# Patient Record
Sex: FEMALE | Race: WHITE | NOT HISPANIC OR LATINO | Employment: FULL TIME | URBAN - METROPOLITAN AREA
[De-identification: names, ages, dates, MRNs, and addresses within clinical notes are randomized per-mention and may not be internally consistent; named-entity substitution may affect disease eponyms.]

---

## 2019-07-18 ENCOUNTER — OFFICE VISIT (OUTPATIENT)
Dept: URGENT CARE | Facility: RETAIL CLINIC | Age: 54
End: 2019-07-18
Payer: COMMERCIAL

## 2019-07-18 VITALS
DIASTOLIC BLOOD PRESSURE: 72 MMHG | HEART RATE: 64 BPM | OXYGEN SATURATION: 98 % | SYSTOLIC BLOOD PRESSURE: 107 MMHG | TEMPERATURE: 98 F

## 2019-07-18 DIAGNOSIS — H60.391 INFECTIVE OTITIS EXTERNA, RIGHT: Primary | ICD-10-CM

## 2019-07-18 PROCEDURE — 99213 OFFICE O/P EST LOW 20 MIN: CPT | Performed by: NURSE PRACTITIONER

## 2019-07-18 RX ORDER — NEOMYCIN POLYMYXIN B SULFATES AND DEXAMETHASONE 3.5; 10000; 1 MG/ML; [USP'U]/ML; MG/ML
SUSPENSION/ DROPS OPHTHALMIC
Qty: 5 ML | Refills: 0 | Status: SHIPPED | OUTPATIENT
Start: 2019-07-18 | End: 2019-07-23

## 2019-07-18 ASSESSMENT — ENCOUNTER SYMPTOMS
APPETITE CHANGE: 0
SORE THROAT: 0
HEADACHES: 0
DIZZINESS: 0
SINUS PRESSURE: 0
COLOR CHANGE: 0
ADENOPATHY: 0
FEVER: 0
COUGH: 0
FATIGUE: 0
ACTIVITY CHANGE: 0
WEAKNESS: 0
LIGHT-HEADEDNESS: 0
DIAPHORESIS: 0
CHILLS: 0

## 2019-07-18 NOTE — PATIENT INSTRUCTIONS
Discussed both otitis externa and cerumen build up. Favor treating infection first prior to in office irrigation as this could damage canal.  Neomycin-polymyxin-dexamethasone (Maxitrol) 3.5-911422-1.1 opthalmic suspension   3 drops into ear 4 times daily for 7 days  Tylenol or ibuprofen as needed for pain relief.  No Qtips in ear canal. You may clean drainage from external ear with Qtip.  Keep water out of ear until the infection is cleared.  May swim with ear plug if not painful.  Avoid submerging head in bath for 1 week.    Followup with PCP if symptoms worsen or do not resolve.  Can be seen in the future at [facility] for ear lavage without infection.

## 2019-07-18 NOTE — PROGRESS NOTES
Chief Complaint   Patient presents with     Ear Problem     right ear plugged x 5 days     SUBJECTIVE:  Ysabel Griffith is a 53 year old female who presents for evaluation of right ear pain, fullness and wax for 5 day(s).  Severity: moderate   Timing: gradual onset  Additional symptoms include none.  Treatment measures tried include: debrox, candle.  History of recurrent otitis: no, but she has needed her ears lavaged years ago.  Predisposing factors include: None.    Past Medical History:   Diagnosis Date     Recurrent major depression in complete remission (H) 12/18/2012       No current outpatient medications on file prior to visit.  No current facility-administered medications on file prior to visit.   Social History     Tobacco Use     Smoking status: Never Smoker   Substance Use Topics     Alcohol use: No     Allergies   Allergen Reactions     No Known Allergies      Review of Systems   Constitutional: Negative for activity change, appetite change, chills, diaphoresis, fatigue and fever.   HENT: Positive for ear pain (and fullness, right.). Negative for congestion, ear discharge, hearing loss, sinus pressure, sore throat and tinnitus.    Respiratory: Negative for cough.    Skin: Negative for color change and rash.   Neurological: Negative for dizziness, weakness, light-headedness and headaches.   Hematological: Negative for adenopathy.     OBJECTIVE:  /72   Pulse 64   Temp 98  F (36.7  C) (Oral)   SpO2 98%      Physical Exam   Constitutional: She is oriented to person, place, and time. She appears well-developed and well-nourished. No distress.   HENT:   Head: Normocephalic and atraumatic.   Nose: Nose normal.   Mouth/Throat: Oropharynx is clear and moist.   Left external auditory canal with mild cerumen, tympanic membrane pearly white. Right canal with erythema, edema, maceration, thick off white discharge, blood, and moderate orange cerumen. Right canal was tender with otoscope inspection.  Could not visualize right tympanic membrane.   Eyes: Pupils are equal, round, and reactive to light. Conjunctivae and EOM are normal. Right eye exhibits no discharge. Left eye exhibits no discharge.   Neck: Normal range of motion. Neck supple.   Pulmonary/Chest: Effort normal. No respiratory distress.   Musculoskeletal: Normal range of motion. She exhibits no tenderness.   Lymphadenopathy:     She has no cervical adenopathy.   Neurological: She is alert and oriented to person, place, and time.   Skin: Skin is warm and dry. No rash noted. She is not diaphoretic.   Psychiatric: She has a normal mood and affect. Her behavior is normal.   Vitals reviewed.    ASSESSMENT:    ICD-10-CM    1. Infective otitis externa, right H60.391 neomycin-polymixin-dexamethasone (MAXITROL) 0.1 % ophthalmic suspension       PLAN:   Patient Instructions   Discussed both otitis externa and cerumen build up. Favor treating infection first prior to in office irrigation as this could damage canal.  Neomycin-polymyxin-dexamethasone (Maxitrol) 3.5-599546-9.1 opthalmic suspension   3 drops into ear 4 times daily for 7 days  Tylenol or ibuprofen as needed for pain relief.  No Qtips in ear canal. You may clean drainage from external ear with Qtip.  Keep water out of ear until the infection is cleared.  May swim with ear plug if not painful.  Avoid submerging head in bath for 1 week.    Followup with PCP if symptoms worsen or do not resolve.  Can be seen in the future at [facility] for ear lavage without infection.      Follow up with primary care provider with any problems, questions or concerns or if symptoms worsen or fail to improve. Patient agreed to plan and verbalized understanding.    LAURA Penaloza  [facility]

## 2019-07-23 ENCOUNTER — OFFICE VISIT (OUTPATIENT)
Dept: FAMILY MEDICINE | Facility: CLINIC | Age: 54
End: 2019-07-23
Payer: COMMERCIAL

## 2019-07-23 VITALS
BODY MASS INDEX: 25.74 KG/M2 | DIASTOLIC BLOOD PRESSURE: 72 MMHG | HEIGHT: 64 IN | OXYGEN SATURATION: 100 % | RESPIRATION RATE: 16 BRPM | TEMPERATURE: 97.9 F | HEART RATE: 104 BPM | SYSTOLIC BLOOD PRESSURE: 100 MMHG | WEIGHT: 150.8 LBS

## 2019-07-23 DIAGNOSIS — H92.01 OTALGIA, RIGHT: Primary | ICD-10-CM

## 2019-07-23 DIAGNOSIS — H61.21 IMPACTED CERUMEN OF RIGHT EAR: ICD-10-CM

## 2019-07-23 DIAGNOSIS — F33.42 RECURRENT MAJOR DEPRESSION IN COMPLETE REMISSION (H): ICD-10-CM

## 2019-07-23 DIAGNOSIS — Z12.31 ENCOUNTER FOR SCREENING MAMMOGRAM FOR BREAST CANCER: ICD-10-CM

## 2019-07-23 DIAGNOSIS — Z23 ENCOUNTER FOR IMMUNIZATION: ICD-10-CM

## 2019-07-23 PROCEDURE — 90471 IMMUNIZATION ADMIN: CPT | Performed by: FAMILY MEDICINE

## 2019-07-23 PROCEDURE — 99213 OFFICE O/P EST LOW 20 MIN: CPT | Mod: 25 | Performed by: FAMILY MEDICINE

## 2019-07-23 PROCEDURE — 69210 REMOVE IMPACTED EAR WAX UNI: CPT | Mod: RT | Performed by: FAMILY MEDICINE

## 2019-07-23 PROCEDURE — 90715 TDAP VACCINE 7 YRS/> IM: CPT | Performed by: FAMILY MEDICINE

## 2019-07-23 ASSESSMENT — PAIN SCALES - GENERAL: PAINLEVEL: SEVERE PAIN (7)

## 2019-07-23 ASSESSMENT — MIFFLIN-ST. JEOR: SCORE: 1274.02

## 2019-07-23 ASSESSMENT — PATIENT HEALTH QUESTIONNAIRE - PHQ9: SUM OF ALL RESPONSES TO PHQ QUESTIONS 1-9: 0

## 2019-07-23 NOTE — PROGRESS NOTES
"Subjective     Ysabel Griffith is a 53 year old female who presents to clinic today for the following health issues:    HPI   RESPIRATORY SYMPTOMS      Duration: 2 weeks    Description  ear pain bilateral, but more in the right then the left    Severity: severe    Accompanying signs and symptoms: None    History (predisposing factors):  none    Precipitating or alleviating factors: None    Therapies tried and outcome:  Prescribed ear drops that gave no relief.          Ear pain for past 2 weeks.  Decreased hearing.  [facility]  Not better.  No left ear symptoms.    Reviewed and updated as needed this visit by Provider  Tobacco  Allergies  Meds  Problems  Med Hx  Surg Hx  Fam Hx         Review of Systems   ROS COMP: Constitutional, HEENT, cardiovascular, pulmonary, gi and gu systems are negative, except as otherwise noted.      Objective    /72   Pulse 104   Temp 97.9  F (36.6  C) (Temporal)   Resp 16   Ht 1.626 m (5' 4\")   Wt 68.4 kg (150 lb 12.8 oz)   LMP 12/04/2012   SpO2 100%   BMI 25.88 kg/m    Body mass index is 25.88 kg/m .  Physical Exam   Constitutional: She appears well-developed and well-nourished.   HENT:   Right ear canal obstructed with cerumen.  This was first flushed by nursing using tap water.  It was almost completely removed, but there was some cerumen remnants that were removed by me with cerumen spoon.  Canal reexamined using otoscope and TM was within normal limits.  Her hearing and ear pressure/pain improved after cerumen was removed.    Left ear canal had mild cerumen circumferentially around the distal end.  Attempted to remove with ear curette but had to stop due to patient discomfort.                  Assessment & Plan     ASSESSMENT/ORDERS:    ICD-10-CM    1. Otalgia, right H92.01 REMOVE IMPACTED CERUMEN   2. Impacted cerumen of right ear H61.21 REMOVE IMPACTED CERUMEN   3. Encounter for screening mammogram for breast cancer " "Z12.31 *MA Screening Digital Bilateral   4. Encounter for immunization Z23 TDAP, IM (10 - 64 YRS) - Adacel     ADMIN 1st VACCINE   5. Recurrent major depression in complete remission (H) F33.42 DEPRESSION ACTION PLAN (DAP)     PLAN:  1.  Cerumen removed as noted above.  Symptoms improved.      BMI:   Estimated body mass index is 25.88 kg/m  as calculated from the following:    Height as of this encounter: 1.626 m (5' 4\").    Weight as of this encounter: 68.4 kg (150 lb 12.8 oz).               Return in about 2 months (around 9/23/2019) for next preventative visit (Physical).    Vega Jonas MD  [facility]    "

## 2019-07-23 NOTE — NURSING NOTE
Screening Mammogram ordered and scheduled.  Appointment for Physical scheduled.  Ysabel is notified of dates and times and AVS given to pt.

## 2019-12-07 ENCOUNTER — OFFICE VISIT (OUTPATIENT)
Dept: URGENT CARE | Facility: RETAIL CLINIC | Age: 54
End: 2019-12-07

## 2019-12-07 VITALS
HEART RATE: 95 BPM | RESPIRATION RATE: 16 BRPM | TEMPERATURE: 99 F | DIASTOLIC BLOOD PRESSURE: 71 MMHG | OXYGEN SATURATION: 98 % | SYSTOLIC BLOOD PRESSURE: 101 MMHG

## 2019-12-07 DIAGNOSIS — J20.9 ACUTE BRONCHITIS WITH SYMPTOMS > 10 DAYS: Primary | ICD-10-CM

## 2019-12-07 PROCEDURE — 99213 OFFICE O/P EST LOW 20 MIN: CPT | Performed by: FAMILY MEDICINE

## 2019-12-07 RX ORDER — DOXYCYCLINE 100 MG/1
100 CAPSULE ORAL 2 TIMES DAILY
Qty: 20 CAPSULE | Refills: 0 | Status: SHIPPED | OUTPATIENT
Start: 2019-12-07 | End: 2019-12-17

## 2019-12-07 NOTE — PROGRESS NOTES
SUBJECTIVE:  Ysabel Griffith is a 54 year old female who presents to the clinic today with a chief complaint of cough  for 3 week(s).  Her cough is described as persistent and productive of yellow sputum.    The patient's symptoms are moderate and worsening.  Associated symptoms include nasal congestion, rhinorrhea, malaise and myalgias. The patient's symptoms are exacerbated by lying down  Patient has been using OTC cough suppressants  to improve symptoms.    Past Medical History:   Diagnosis Date     Recurrent major depression in complete remission (H) 12/18/2012     Current Outpatient Medications   Medication Sig Dispense Refill     doxycycline monohydrate (MONODOX) 100 MG capsule Take 1 capsule (100 mg) by mouth 2 times daily for 10 days 20 capsule 0     History   Smoking Status     Never Smoker   Smokeless Tobacco     Never Used       ROS  Review of systems negative except as stated above.    OBJECTIVE:  /71 (BP Location: Left arm, Patient Position: Sitting, Cuff Size: Adult Regular)   Pulse 95   Temp 99  F (37.2  C) (Oral)   Resp 16   LMP 12/04/2012   SpO2 98%   GENERAL APPEARANCE: alert, mild distress and cooperative  EYES: EOMI,  PERRL, conjunctiva clear  HENT: ear canals and TM's normal.  Nose and mouth without ulcers, erythema or lesions  NECK: supple, nontender, no lymphadenopathy  RESP: rhonchi few scattered  CV: regular rates and rhythm, normal S1 S2, no murmur noted  ABDOMEN:  soft, nontender, no HSM or masses and bowel sounds normal  NEURO: Normal strength and tone, sensory exam grossly normal,  normal speech and mentation  SKIN: no suspicious lesions or rashes    ASSESSMENT:    Acute Bronchitis  Acute Sinusitis    PLAN:  Doxycycline, rest.  Symptomatic measures encouraged, humidified air, plenty of fluids.  Follow up with primary care provider if no improvement.

## 2020-11-06 ENCOUNTER — OFFICE VISIT (OUTPATIENT)
Dept: FAMILY MEDICINE | Facility: CLINIC | Age: 55
End: 2020-11-06
Payer: COMMERCIAL

## 2020-11-06 VITALS
SYSTOLIC BLOOD PRESSURE: 102 MMHG | TEMPERATURE: 97.9 F | BODY MASS INDEX: 24.26 KG/M2 | RESPIRATION RATE: 12 BRPM | DIASTOLIC BLOOD PRESSURE: 70 MMHG | HEIGHT: 65 IN | HEART RATE: 100 BPM | OXYGEN SATURATION: 100 % | WEIGHT: 145.6 LBS

## 2020-11-06 DIAGNOSIS — L72.0 EPIDERMAL INCLUSION CYST: Primary | ICD-10-CM

## 2020-11-06 DIAGNOSIS — Z23 NEED FOR PROPHYLACTIC VACCINATION AND INOCULATION AGAINST INFLUENZA: ICD-10-CM

## 2020-11-06 PROCEDURE — 90682 RIV4 VACC RECOMBINANT DNA IM: CPT | Performed by: FAMILY MEDICINE

## 2020-11-06 PROCEDURE — 90471 IMMUNIZATION ADMIN: CPT | Performed by: FAMILY MEDICINE

## 2020-11-06 ASSESSMENT — MIFFLIN-ST. JEOR: SCORE: 1248.38

## 2020-11-06 NOTE — PROGRESS NOTES
Prior to immunization administration, verified patients identity using patient s name and date of birth. Please see Immunization Activity for additional information.     Screening Questionnaire for Adult Immunization    Are you sick today?   No   Do you have allergies to medications, food, a vaccine component or latex?   No   Have you ever had a serious reaction after receiving a vaccination?   No   Do you have a long-term health problem with heart, lung, kidney, or metabolic disease (e.g., diabetes), asthma, a blood disorder, no spleen, complement component deficiency, a cochlear implant, or a spinal fluid leak?  Are you on long-term aspirin therapy?   No   Do you have cancer, leukemia, HIV/AIDS, or any other immune system problem?   No   Do you have a parent, brother, or sister with an immune system problem?   No   In the past 3 months, have you taken medications that affect  your immune system, such as prednisone, other steroids, or anticancer drugs; drugs for the treatment of rheumatoid arthritis, Crohn s disease, or psoriasis; or have you had radiation treatments?   No   Have you had a seizure, or a brain or other nervous system problem?   No   During the past year, have you received a transfusion of blood or blood    products, or been given immune (gamma) globulin or antiviral drug?   No   For women: Are you pregnant or is there a chance you could become       pregnant during the next month?   No   Have you received any vaccinations in the past 4 weeks?   No     Immunization questionnaire answers were all negative.        Per orders of Dr. Vega Jonas, injection of flu shot given by Ekaterina Fofana. Patient instructed to remain in clinic for 15 minutes afterwards, and to report any adverse reaction to me immediately.       Screening performed by Ekaterina Fofana on 11/6/2020 at 4:52 PM.

## 2020-11-06 NOTE — PROGRESS NOTES
Subjective     Ysabel Griffith is a 55 year old female who presents to clinic today for the following health issues:    HPI         Concern - large cyst on right shoulder blade   Onset: over 1 year  Description: hard cyst/boil  on right shoulder blade for over 1 year, and seem like it is getting bigger  Intensity: moderate  Progression of Symptoms:  worsening  Accompanying Signs & Symptoms: none  Previous history of similar problem: yes, drained on it own x 2 years ago  Precipitating factors:        Worsened by: none  Alleviating factors:        Improved by: none  Therapies tried and outcome: None      Patient concerned about growing size of lesion.  Wants to know what to do for management.      Review of Systems   Constitutional, HEENT, cardiovascular, pulmonary, gi and gu systems are negative, except as otherwise noted.      Objective    LMP 12/04/2012   There is no height or weight on file to calculate BMI.  Physical Exam  Constitutional:       Appearance: Normal appearance.   Skin:     Comments: Right posterior shoulder has dermal layer firm, non mobile nodule with central punctuation consistent with inclusion cyst.   Neurological:      Mental Status: She is alert.                    Assessment & Plan     ASSESSMENT/ORDERS:    ICD-10-CM    1. Epidermal inclusion cyst  L72.0    2. Need for prophylactic vaccination and inoculation against influenza  Z23 INFLUENZA QUAD, RECOMBINANT, P-FREE (RIV4) (FLUBLOCK) [69125]     PLAN:  1.  Recommended excision of lesion due to growing size and discomfort.  Appointment for this scheduled today.             Return in about 4 weeks (around 12/4/2020) for cyst removal.    Vega Jonas MD  [facility]

## 2020-11-15 PROBLEM — L72.0 EPIDERMAL INCLUSION CYST: Status: ACTIVE | Noted: 2020-11-15

## 2020-12-10 ENCOUNTER — OFFICE VISIT (OUTPATIENT)
Dept: FAMILY MEDICINE | Facility: CLINIC | Age: 55
End: 2020-12-10
Payer: COMMERCIAL

## 2020-12-10 VITALS
SYSTOLIC BLOOD PRESSURE: 106 MMHG | RESPIRATION RATE: 16 BRPM | DIASTOLIC BLOOD PRESSURE: 72 MMHG | TEMPERATURE: 98.2 F | HEIGHT: 65 IN | OXYGEN SATURATION: 99 % | WEIGHT: 146 LBS | BODY MASS INDEX: 24.32 KG/M2 | HEART RATE: 78 BPM

## 2020-12-10 DIAGNOSIS — L72.0 EPIDERMAL INCLUSION CYST: Primary | ICD-10-CM

## 2020-12-10 PROCEDURE — 13100 CMPLX RPR TRUNK 1.1-2.5 CM: CPT | Performed by: FAMILY MEDICINE

## 2020-12-10 PROCEDURE — 11403 EXC TR-EXT B9+MARG 2.1-3CM: CPT | Performed by: FAMILY MEDICINE

## 2020-12-10 ASSESSMENT — PAIN SCALES - GENERAL: PAINLEVEL: NO PAIN (0)

## 2020-12-10 ASSESSMENT — MIFFLIN-ST. JEOR: SCORE: 1258.13

## 2020-12-10 ASSESSMENT — PATIENT HEALTH QUESTIONNAIRE - PHQ9: SUM OF ALL RESPONSES TO PHQ QUESTIONS 1-9: 0

## 2020-12-21 NOTE — PROGRESS NOTES
"Subjective     Ysabel Griffith is a 55 year old female who presents to clinic today for the following health issues:    HPI             Here today for inclusion cyst removal on the right upper back.  Seen for office visit a month ago and recommended for removal.      Review of Systems   Constitutional, HEENT, cardiovascular, pulmonary, gi and gu systems are negative, except as otherwise noted.      Objective    /72   Pulse 78   Temp 98.2  F (36.8  C) (Temporal)   Resp 16   Ht 1.651 m (5' 5\")   Wt 66.2 kg (146 lb)   LMP 12/04/2012   SpO2 99%   Breastfeeding No   BMI 24.30 kg/m    Body mass index is 24.3 kg/m .  Physical Exam  Constitutional:       Appearance: Normal appearance.   Skin:     Comments: Right posterior shoulder has dermal layer firm, non mobile nodule with central punctuation consistent with inclusion cyst.   Neurological:      Mental Status: She is alert.                    Assessment & Plan     ASSESSMENT/ORDERS:    ICD-10-CM    1. Epidermal inclusion cyst  L72.0 EXC BENIGN SKIN LESION TRUNK/ARM/LEG 2.1-3.0 CM     REPR CMPL WND TRUNK 1.1-2.5CM     PLAN:  1.  After written consent was obtained and risks and benefits were discussed, cyst was prepped in sterile fashion.  5 ml of lidocaine with epi was injected into affected area.  A #15 blade was used to incise area with total length of incision being 2.3 cm.  Area was dissected with scissors and hemostat until cyst sac was freed from surrounding soft tissue and cyst removed intact.  The cyst was cut and white, cheezy, foul smelling material was noted inside the cyst making it clear that it is an epidermal cyst.  Pathological confirmation was therefore not obtained.  The wound was quite deep and had large cavity, therefore the wound was first closed with application of deep dermal sutures with 2 interrupted 4-0 vicryl.  Then the superficial layer was closed with three 4-0 nylon sutures.  Adequate hemostasis was obtained.  The wound " was dressed and signs of infection and care of the wound was discussed.    Patient will return in 12 days for suture removal and for recheck of the site.               Return in about 12 days (around 12/22/2020) for suture removal .    Vega Jonas MD  [facility]

## 2020-12-23 ENCOUNTER — ALLIED HEALTH/NURSE VISIT (OUTPATIENT)
Dept: FAMILY MEDICINE | Facility: CLINIC | Age: 55
End: 2020-12-23
Payer: COMMERCIAL

## 2020-12-23 DIAGNOSIS — Z48.02 VISIT FOR SUTURE REMOVAL: Primary | ICD-10-CM

## 2020-12-23 PROCEDURE — 99207 PR NO CHARGE NURSE ONLY: CPT

## 2020-12-23 NOTE — PROGRESS NOTES
Ysabel Griffith presents to the clinic today for removal of sutures.  The patient has had the sutures in place for 13 days.  There has been no history of infection or drainage.  3 sutures are seen located on the shoulder.  The wound is healing well with no signs of infection.  Tetanus status is up to date.   All sutures were easily removed today.  Routine wound care discussed.  The patient will follow up as needed.    Windy Zhu RN

## 2023-03-12 ENCOUNTER — HOSPITAL ENCOUNTER (EMERGENCY)
Facility: CLINIC | Age: 58
Discharge: HOME | End: 2023-03-12
Attending: FAMILY MEDICINE | Admitting: FAMILY MEDICINE
Payer: COMMERCIAL

## 2023-03-12 ENCOUNTER — APPOINTMENT (OUTPATIENT)
Dept: GENERAL RADIOLOGY | Facility: CLINIC | Age: 58
End: 2023-03-12
Attending: FAMILY MEDICINE
Payer: COMMERCIAL

## 2023-03-12 VITALS
SYSTOLIC BLOOD PRESSURE: 128 MMHG | HEART RATE: 80 BPM | TEMPERATURE: 98.1 F | RESPIRATION RATE: 18 BRPM | DIASTOLIC BLOOD PRESSURE: 80 MMHG | OXYGEN SATURATION: 99 %

## 2023-03-12 DIAGNOSIS — S60.552A FOREIGN BODY IN HAND, LEFT, INITIAL ENCOUNTER: ICD-10-CM
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
## 2023-03-12 PROCEDURE — 76882 US LMTD JT/FCL EVL NVASC XTR: CPT | Mod: LT | Performed by: FAMILY MEDICINE

## 2023-03-12 PROCEDURE — 99284 EMERGENCY DEPT VISIT MOD MDM: CPT | Mod: 25 | Performed by: FAMILY MEDICINE

## 2023-03-12 PROCEDURE — 250N000013 HC RX MED GY IP 250 OP 250 PS 637: Performed by: FAMILY MEDICINE

## 2023-03-12 PROCEDURE — 73130 X-RAY EXAM OF HAND: CPT | Mod: LT

## 2023-03-12 PROCEDURE — 99284 EMERGENCY DEPT VISIT MOD MDM: CPT | Performed by: FAMILY MEDICINE

## 2023-03-12 RX ORDER — OXYCODONE HYDROCHLORIDE 5 MG/1
5 TABLET ORAL EVERY 4 HOURS PRN
Qty: 12 TABLET | Refills: 0 | Status: SHIPPED | OUTPATIENT
Start: 2023-03-12

## 2023-03-12 RX ORDER — OXYCODONE HYDROCHLORIDE 5 MG/1
10 TABLET ORAL ONCE
Status: COMPLETED | OUTPATIENT
Start: 2023-03-12 | End: 2023-03-12

## 2023-03-12 RX ORDER — CEPHALEXIN 500 MG/1
500 CAPSULE ORAL 4 TIMES DAILY
Qty: 28 CAPSULE | Refills: 0 | Status: SHIPPED | OUTPATIENT
Start: 2023-03-12 | End: 2023-03-19

## 2023-03-12 RX ADMIN — OXYCODONE HYDROCHLORIDE 10 MG: 5 TABLET ORAL at 22:22

## 2023-03-12 RX ADMIN — CEPHALEXIN 500 MG: 250 CAPSULE ORAL at 22:22

## 2023-03-12 ASSESSMENT — ACTIVITIES OF DAILY LIVING (ADL): ADLS_ACUITY_SCORE: 35

## 2023-03-12 NOTE — LETTER
March 31, 2023      To Whom It May Concern:      Ysabel Griffith was seen in our Emergency Department on 03/12/23.  At that time, she had a hand injury and was unable to use her left hand for work for the next 3 days.        Sincerely,        Bean Mensah MD

## 2023-03-12 NOTE — LETTER
March 31, 2023      To Whom It May Concern:      Ysabel Griffith was seen in our Emergency Department on 03/12/23.    She was injured and unable to work the next 3 days.      Sincerely,        Bean Mensah MD

## 2023-03-13 NOTE — ED NOTES
Provider attempted to remove ring with ring cutter, patient was able to remove herself after a few attempts.

## 2023-03-13 NOTE — ED PROVIDER NOTES
I was asked to evaluate the patient for possible nerve block in order to facilitate the removal of an impaled migue needle in her hand.  It is located in the distribution of the medial nerve in the thenar eminence and towards the wrist on the volar aspect of the patient's left hand.    After risk-benefit discussion and written consent with the patient we decided to proceed with an ultrasound-guided median nerve block.  A  scan was performed identifying the ideal area for the block.  The area was surveyed for vascular structures in the path of the needle and found to be a good location.  The site was marked with a marking pen and the area was prepped with chloraprep.  A small wheal of anesthetic, 1% lidocaine was placed prior to insertion of the 18-gauge blunt tip needle for the nerve block.  The needle was visualized entering the area next to the median nerve and just below the fascia the area surrounding the nerve was infiltrated with the 6 mls of 0.5% bupivicaine.  The patient tolerated the procedure well and had no adverse effects.  The anesthetic effect was present in the proper distribution within 10 minutes after infiltration.  There were no complications.  Blood loss was minimal.  Area was cleansed post injection.      Gama Bhakta MD  03/12/23 [id number]
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
## 2023-03-15 ENCOUNTER — TELEPHONE (OUTPATIENT)
Dept: EMERGENCY MEDICINE | Facility: CLINIC | Age: 58
End: 2023-03-15

## 2023-03-16 NOTE — TELEPHONE ENCOUNTER
Reason for Call:  Form, our goal is to have forms completed with 72 hours, however, some forms may require a visit or additional information.    Type of letter, form or note:  work     Who is the form from?: Work note (if other please explain)    Where did the form come from: Patient or family brought in         What number is listed as a contact on the form?: [phone]       Additional comments:     PT was in on 3/15 looking to get a doctors note for visit on 3/12 to be out 3/13-3/15.    She is willing to wait until you return and is wondering if someone can call her or mail it to home address upon finishing.     Call taken on 3/15/2023 at 9:14 PM by Hamida Rodriguez